# Patient Record
Sex: FEMALE | Race: WHITE | URBAN - METROPOLITAN AREA
[De-identification: names, ages, dates, MRNs, and addresses within clinical notes are randomized per-mention and may not be internally consistent; named-entity substitution may affect disease eponyms.]

---

## 2023-08-18 PROBLEM — R53.83 FATIGUE: Status: ACTIVE | Noted: 2023-08-18

## 2023-08-18 PROBLEM — N60.42 MAMMARY DUCT ECTASIA OF LEFT BREAST: Status: ACTIVE | Noted: 2023-08-18

## 2023-08-18 PROBLEM — F17.200 NICOTINE DEPENDENCE: Status: ACTIVE | Noted: 2023-08-18

## 2023-08-18 PROBLEM — Z98.891 H/O: CESAREAN SECTION: Status: ACTIVE | Noted: 2023-08-18

## 2023-08-18 PROBLEM — E87.6 HYPOKALEMIA: Status: ACTIVE | Noted: 2023-08-18

## 2023-08-18 PROBLEM — R12 HEARTBURN: Status: ACTIVE | Noted: 2023-08-18

## 2023-08-18 RX ORDER — OMEPRAZOLE 40 MG/1
1 CAPSULE, DELAYED RELEASE ORAL DAILY
COMMUNITY
Start: 2021-06-24 | End: 2023-08-23 | Stop reason: SDUPTHER

## 2023-08-18 RX ORDER — MULTIVITAMIN
1 TABLET ORAL DAILY
COMMUNITY
End: 2023-08-23 | Stop reason: WASHOUT

## 2023-08-18 RX ORDER — NORETHINDRONE 0.35 MG/1
1 TABLET ORAL DAILY
COMMUNITY
End: 2023-08-23 | Stop reason: WASHOUT

## 2023-08-18 RX ORDER — POTASSIUM CHLORIDE 750 MG/1
CAPSULE, EXTENDED RELEASE ORAL
COMMUNITY
Start: 2022-05-17 | End: 2023-08-23 | Stop reason: WASHOUT

## 2023-08-23 ENCOUNTER — OFFICE VISIT (OUTPATIENT)
Dept: PRIMARY CARE | Facility: CLINIC | Age: 43
End: 2023-08-23
Payer: COMMERCIAL

## 2023-08-23 VITALS
DIASTOLIC BLOOD PRESSURE: 87 MMHG | OXYGEN SATURATION: 100 % | HEIGHT: 66 IN | WEIGHT: 143.6 LBS | SYSTOLIC BLOOD PRESSURE: 133 MMHG | HEART RATE: 75 BPM | BODY MASS INDEX: 23.08 KG/M2

## 2023-08-23 DIAGNOSIS — R19.7 INTERMITTENT DIARRHEA: Primary | ICD-10-CM

## 2023-08-23 DIAGNOSIS — R12 HEARTBURN: ICD-10-CM

## 2023-08-23 DIAGNOSIS — Z91.018 FOOD ALLERGY: ICD-10-CM

## 2023-08-23 PROCEDURE — 99213 OFFICE O/P EST LOW 20 MIN: CPT | Performed by: NURSE PRACTITIONER

## 2023-08-23 RX ORDER — OMEPRAZOLE 40 MG/1
40 CAPSULE, DELAYED RELEASE ORAL DAILY
Qty: 90 CAPSULE | Refills: 3 | Status: SHIPPED | OUTPATIENT
Start: 2023-08-23

## 2023-08-23 NOTE — PROGRESS NOTES
Subjective   Patient ID: Leann Yepez is a 43 y.o. female who presents for Diverticulitis (Sx pain in lower left side of stomach, bloating. Pt was already diagnosed with IBS and experiences IBS Sx often. Within the last 2 weeks, pt has has Sx increased. Pt stated her mother has Diverticulitis so she believes she has it as well./ ).    HPI     Reports that she believes she has IBS-D. Symptoms come on with anxiety, dairy products, alcohol, tobacco use, or skipping meals. She gets soft stools about twice per month. Twice in the last year, she has episodes of LLQ abdominal pain, nausea, bloating, and worsened diarrhea. The most recent episode was 2 weeks ago. Episodes last about 2 days. This most recent time she took Pepto and it helped. Denies vomiting. This last episode she had blood on the toilet paper when she wiped. Has never had a colonoscopy. No family history of colon cancer. Her mother has diverticulosis. She believes she is having diverticulitis flares.     She does have heartburn and takes omeprazole. Would like a refill.     Also mentions that with certain foods she gets a tingling sensation in her mouth. Denies mouth swelling, trouble breathing.     Review of Systems  ROS negative except as noted above in HPI.       Objective   There were no vitals taken for this visit.    Physical Exam  General: Alert and oriented, in no acute distress. Appears stated age, well-nourished, and well hydrated  HEENT:  - Head: Normocephalic and atraumatic   - Eyes: EOMI, PERRLA  - ENT: Hearing grossly intact  Heart: RRR. No murmurs, clicks, or rubs  Lungs: Unlabored breathing. CTAB with no crackles, wheezes, or rhonchi  Abdomen: Normal BS in all 4 quadrants. Soft, non-tender, non-distended, with no masses  Extremities: Warm and well perfused. No edema. Normal peripheral pulses  Musculoskeletal: Normal gait and station  Neurological: Alert and oriented. No gross neurological deficits  Psychological: Appropriate mood and  affect  Skin: No rash, abnormal lesions, cyanosis, or erythema      Assessment/Plan   Intermittent diarrhea  - Patient believes she has IBS, possible diverticulosis   - Referral placed for GI  - Check food allergy profile, celiac    Mouth tingling  - Check food allergies     RTC pending the above    RENEE Lewis-CNP  [facility]

## 2023-08-24 DIAGNOSIS — T78.40XA ALLERGY, INITIAL ENCOUNTER: Primary | ICD-10-CM

## 2023-09-25 ENCOUNTER — LAB (OUTPATIENT)
Dept: LAB | Facility: LAB | Age: 43
End: 2023-09-25
Payer: COMMERCIAL

## 2023-09-25 DIAGNOSIS — T78.40XA ALLERGY, INITIAL ENCOUNTER: ICD-10-CM

## 2023-09-25 DIAGNOSIS — Z91.018 FOOD ALLERGY: ICD-10-CM

## 2023-09-25 DIAGNOSIS — R19.7 INTERMITTENT DIARRHEA: ICD-10-CM

## 2023-09-25 PROCEDURE — 86003 ALLG SPEC IGE CRUDE XTRC EA: CPT

## 2023-09-25 PROCEDURE — 36415 COLL VENOUS BLD VENIPUNCTURE: CPT

## 2023-09-25 PROCEDURE — 82785 ASSAY OF IGE: CPT

## 2023-09-26 LAB
ALLERGEN ANIMAL: CAT DANDER IGE (KU/L): <0.1 KU/L
ALLERGEN ANIMAL: DOG DANDER IGE (KU/L): <0.1 KU/L
ALLERGEN FOOD: BANANA (MUSA SPP.) IGE (KU/L): <0.1 KU/L
ALLERGEN FOOD: CLAM (RUDITAPES SPP.) IGE (KU/L): <0.1 KU/L
ALLERGEN FOOD: EGG WHITE IGE (KU/L): <0.1 KU/L
ALLERGEN FOOD: FISH (COD) GADUS MORHUA) IGE (KU/L): <0.1 KU/L
ALLERGEN FOOD: MAIZE, CORN (ZEA MAYS) IGE (KU/L): <0.1 KU/L
ALLERGEN FOOD: MILK IGE (KU/L): <0.1 KU/L
ALLERGEN FOOD: PEANUT (ARACHIS HYPOGAEA) IGE (KU/L): <0.1 KU/L
ALLERGEN FOOD: SCALLOP (PECTEN SPP.) IGE (KU/L): <0.1 KU/L
ALLERGEN FOOD: SESAME SEED (SESAMUM INDICUM) IGE (KU/L): <0.1 KU/L
ALLERGEN FOOD: SHRIMP (P. BOREALIS/MONODON, M. BARBATA/JOYNERI) IGE (KU/L): <0.1 KU/L
ALLERGEN FOOD: SOYBEAN (GLYCINE MAX) IGE (KU/L): <0.1 KU/L
ALLERGEN FOOD: STRAWBERRY (FRAGARIA VESCA) IGE (KU/L): <0.1 KU/L
ALLERGEN FOOD: WALNUT (JUGLANS SPP.) IGE (KU/L): <0.1 KU/L
ALLERGEN FOOD: WHEAT (TRITICUM AESTIVUM) IGE (KU/L): <0.1 KU/L
ALLERGEN GRASS: BERMUDA GRASS (CYNODON DACTYLON) IGE (KU/L): <0.1 KU/L
ALLERGEN GRASS: JOHNSON GRASS (SORGHUM HALEPENSE) IGE (KU/L): <0.1 KU/L
ALLERGEN GRASS: MEADOW GRASS, KENTUCKY BLUE (POA PRATENSIS )IGE (KU/L): <0.1 KU/L
ALLERGEN GRASS: TIMOTHY GRASS (PHLEUM PRATENSE) IGE (KU/L): <0.1 KU/L
ALLERGEN INSECT: COCKROACH IGE: <0.1 KU/L
ALLERGEN MICROORGANISM: ALTERNARIA ALTERNATA IGE (KU/L): <0.1 KU/L
ALLERGEN MICROORGANISM: ASPERGILLUS FUMIGATUS IGE (KU/L): <0.1 KU/L
ALLERGEN MICROORGANISM: CLADOSPORIUM HERBARUM IGE (KU/L): <0.1 KU/L
ALLERGEN MICROORGANISM: PENICILLIUM CHRYSOGENUM (P. NOTATUM) IGE (KU/L): <0.1 KU/L
ALLERGEN MITE: DERMATOPHAGOIDES FARINAE (HOUSE DUST MITE) IGE (KU/L): 2.06 KU/L
ALLERGEN MITE: DERMATOPHAGOIDES PTERONYSSINUS (HOUSE DUST MITE) IGE (KU/L): 1.73 KU/L
ALLERGEN TREE: BOX-ELDER (ACER NEGUNDO) IGE (KU/L): <0.1 KU/L
ALLERGEN TREE: COMMON SILVER BIRCH (BETULA VERRUCOSA) IGE (KU/L): <0.1 KU/L
ALLERGEN TREE: COTTONWOOD (POPULUS DELTOIDES) IGE (KU/L): <0.1 KU/L
ALLERGEN TREE: ELM (ULMUS AMERICANA) IGE (KU/L): <0.1 KU/L
ALLERGEN TREE: MAPLE LEAF SYCAMORE, LONDON PLANE IGE (KU/L): <0.1 KU/L
ALLERGEN TREE: MOUNTAIN JUNIPER (JUNIPERUS SABINOIDES) IGE (KU/L): <0.1 KU/L
ALLERGEN TREE: MULBERRY (MORUS ALBA) IGE (KU/L): <0.1 KU/L
ALLERGEN TREE: OAK (QUERCUS ALBA) IGE (KU/L): <0.1 KU/L
ALLERGEN TREE: PECAN, HICKORY (CARYA PECAN) IGE (KU/L): <0.1 KU/L
ALLERGEN TREE: WALNUT IGE: <0.1 KU/L
ALLERGEN TREE: WHITE ASH (FRAXINUS AMERICANA) IGE (KU/L): <0.1 KU/L
ALLERGEN WEED: COMMON PIGWEED (AMARANTHUS RETROFLEXUS) IGE (KU/L): <0.1 KU/L
ALLERGEN WEED: COMMON RAGWEED (AMB. ARTEMISIIFOLIA/A. ELATIOR) IGE (KU/L): <0.1 KU/L
ALLERGEN WEED: GOOSEFOOT, LAMB'S QUARTERS (CHENOPODIUM ALBUM) IGE (KU/L): <0.1 KU/L
ALLERGEN WEED: PLANTAIN (ENGLISH), RIBWORT (PLANTAGO LANCEOLATA) IGE (KU/L): <0.1 KU/L
ALLERGEN WEED: PRICKLY SALTWORT/RUSSIAN THISTLE (SALSOLA KALI) IGE (KU/L): <0.1 KU/L
ALLERGEN WEED: SHEEP SORREL (RUMEX ACETOSELLA) IGE (KU/L): <0.1 KU/L
IMMUNOCAP IGE: 22.1 KU/L (ref 0–214)
IMMUNOCAP INTERPRETATION: ABNORMAL
IMMUNOCAP INTERPRETATION: NORMAL
Lab: <0.1 KU/L
Lab: <0.1 KU/L

## 2023-09-29 LAB
ALLERGEN FOOD: PINEAPPLE (ANANAS COMOSUS) IGE (KU/L): <0.1 KU/L
IMMUNOCAP INTERPRETATION (ARUP): NORMAL
Lab: <0.1 KU/L

## 2024-04-25 ENCOUNTER — OFFICE VISIT (OUTPATIENT)
Dept: PRIMARY CARE | Facility: CLINIC | Age: 44
End: 2024-04-25
Payer: COMMERCIAL

## 2024-04-25 VITALS
DIASTOLIC BLOOD PRESSURE: 83 MMHG | BODY MASS INDEX: 24.11 KG/M2 | HEART RATE: 84 BPM | SYSTOLIC BLOOD PRESSURE: 124 MMHG | WEIGHT: 150 LBS | HEIGHT: 66 IN

## 2024-04-25 DIAGNOSIS — Z13.6 SCREENING FOR CARDIOVASCULAR CONDITION: ICD-10-CM

## 2024-04-25 DIAGNOSIS — G89.29 CHRONIC PAIN OF BOTH KNEES: Primary | ICD-10-CM

## 2024-04-25 DIAGNOSIS — M25.562 CHRONIC PAIN OF BOTH KNEES: Primary | ICD-10-CM

## 2024-04-25 DIAGNOSIS — M25.561 CHRONIC PAIN OF BOTH KNEES: Primary | ICD-10-CM

## 2024-04-25 LAB
ALBUMIN SERPL BCP-MCNC: 4.3 G/DL (ref 3.4–5)
ALP SERPL-CCNC: 52 U/L (ref 33–110)
ALT SERPL W P-5'-P-CCNC: 16 U/L (ref 7–45)
ANION GAP SERPL CALC-SCNC: 10 MMOL/L (ref 10–20)
AST SERPL W P-5'-P-CCNC: 18 U/L (ref 9–39)
BILIRUB SERPL-MCNC: 0.4 MG/DL (ref 0–1.2)
BUN SERPL-MCNC: 10 MG/DL (ref 6–23)
CALCIUM SERPL-MCNC: 9.1 MG/DL (ref 8.6–10.6)
CHLORIDE SERPL-SCNC: 107 MMOL/L (ref 98–107)
CHOLEST SERPL-MCNC: 155 MG/DL (ref 0–199)
CHOLESTEROL/HDL RATIO: 2.9
CO2 SERPL-SCNC: 29 MMOL/L (ref 21–32)
CREAT SERPL-MCNC: 0.75 MG/DL (ref 0.5–1.05)
EGFRCR SERPLBLD CKD-EPI 2021: >90 ML/MIN/1.73M*2
ERYTHROCYTE [SEDIMENTATION RATE] IN BLOOD BY WESTERGREN METHOD: 3 MM/H (ref 0–20)
EST. AVERAGE GLUCOSE BLD GHB EST-MCNC: 91 MG/DL
GLUCOSE SERPL-MCNC: 89 MG/DL (ref 74–99)
HBA1C MFR BLD: 4.8 %
HDLC SERPL-MCNC: 53.2 MG/DL
LDLC SERPL CALC-MCNC: 86 MG/DL
NON HDL CHOLESTEROL: 102 MG/DL (ref 0–149)
POTASSIUM SERPL-SCNC: 3.5 MMOL/L (ref 3.5–5.3)
PROT SERPL-MCNC: 6.8 G/DL (ref 6.4–8.2)
RHEUMATOID FACT SER NEPH-ACNC: <10 IU/ML (ref 0–15)
SODIUM SERPL-SCNC: 142 MMOL/L (ref 136–145)
TRIGL SERPL-MCNC: 78 MG/DL (ref 0–149)
URATE SERPL-MCNC: 3.6 MG/DL (ref 2.3–6.7)
VLDL: 16 MG/DL (ref 0–40)

## 2024-04-25 PROCEDURE — 86038 ANTINUCLEAR ANTIBODIES: CPT

## 2024-04-25 PROCEDURE — 80053 COMPREHEN METABOLIC PANEL: CPT

## 2024-04-25 PROCEDURE — 36415 COLL VENOUS BLD VENIPUNCTURE: CPT

## 2024-04-25 PROCEDURE — 99213 OFFICE O/P EST LOW 20 MIN: CPT | Performed by: NURSE PRACTITIONER

## 2024-04-25 PROCEDURE — 84550 ASSAY OF BLOOD/URIC ACID: CPT

## 2024-04-25 PROCEDURE — 80061 LIPID PANEL: CPT

## 2024-04-25 PROCEDURE — 83036 HEMOGLOBIN GLYCOSYLATED A1C: CPT

## 2024-04-25 PROCEDURE — 85652 RBC SED RATE AUTOMATED: CPT

## 2024-04-25 PROCEDURE — 86431 RHEUMATOID FACTOR QUANT: CPT

## 2024-04-25 ASSESSMENT — ENCOUNTER SYMPTOMS
DEPRESSION: 0
OCCASIONAL FEELINGS OF UNSTEADINESS: 0
LOSS OF SENSATION IN FEET: 0

## 2024-04-25 NOTE — PROGRESS NOTES
"Subjective   Patient ID: Leann Yepez is a 43 y.o. female who presents for knee pain.     HPI   Knee pain  Reports 1 year history of BL knee pain  Denies any injury or trauma  L worse than R  Initially had a grinding sensation for a couple weeks but that has since resolved  She states they were swollen at one point  About a month ago, she started having worse pain  Her knee feels unstable  Occasionally has a cold/tingling sensation  Pain is not constant, worse with activity   Has taken tylenol and advil which resolves the pain  Wears compression leggings which also help    Review of Systems  ROS negative except as noted above in HPI.     Objective   /83   Pulse 84   Ht 1.676 m (5' 6\")   Wt 68 kg (150 lb)   BMI 24.21 kg/m²     Physical Exam  General: Alert and oriented, in no acute distress. Appears stated age, well-nourished, and well hydrated  HEENT:  - Head: Normocephalic and atraumatic   - Eyes: EOMI, PERRLA  - ENT: Hearing grossly intact  Heart: RRR. No murmurs, clicks, or rubs  Lungs: Unlabored breathing. CTAB with no crackles, wheezes, or rhonchi  Abdomen: Normal BS in all 4 quadrants. Soft, non-tender, non-distended, with no masses  Extremities: Warm and well perfused. No edema. Normal peripheral pulses  Musculoskeletal: TTP of lateral aspect of L knee. Crepitus with flexion of L knee. Normal ROM of BL knees. Normal gait and station  Neurological: Alert and oriented. No gross neurological deficits  Psychological: Appropriate mood and affect  Skin: No rash, abnormal lesions, cyanosis, or erythema    Assessment/Plan   BL knee pain  - Will obtain x-rays  - Will obtain arthritis panel to rule out RA given bilateral nature   - Physical therapy referral  - Can continue ibuprofen or tylenol prn, compression    2. Screening for CV condition  - Check lipid panel, HgA1c, CMP, CBC    RTC in the next few months for physical or sooner prn    RENEE Lewis-CNP  [facility]          "

## 2024-04-25 NOTE — PROGRESS NOTES
Pt comes in for bilateral knee pain. Pt states they were making a grinding sound a couple weeks ago.

## 2024-04-26 LAB — ANA SER QL HEP2 SUBST: NEGATIVE

## 2024-09-09 DIAGNOSIS — R12 HEARTBURN: ICD-10-CM

## 2024-09-09 RX ORDER — OMEPRAZOLE 40 MG/1
40 CAPSULE, DELAYED RELEASE ORAL DAILY
Qty: 90 CAPSULE | Refills: 3 | Status: SHIPPED | OUTPATIENT
Start: 2024-09-09

## 2025-03-21 ENCOUNTER — APPOINTMENT (OUTPATIENT)
Dept: PRIMARY CARE | Facility: CLINIC | Age: 45
End: 2025-03-21
Payer: COMMERCIAL

## 2025-03-21 VITALS
OXYGEN SATURATION: 98 % | BODY MASS INDEX: 26.61 KG/M2 | TEMPERATURE: 97.6 F | DIASTOLIC BLOOD PRESSURE: 82 MMHG | SYSTOLIC BLOOD PRESSURE: 136 MMHG | HEART RATE: 88 BPM | WEIGHT: 165.6 LBS | RESPIRATION RATE: 17 BRPM | HEIGHT: 66 IN

## 2025-03-21 DIAGNOSIS — K21.00 GASTROESOPHAGEAL REFLUX DISEASE WITH ESOPHAGITIS WITHOUT HEMORRHAGE: ICD-10-CM

## 2025-03-21 DIAGNOSIS — Z00.00 ANNUAL PHYSICAL EXAM: Primary | ICD-10-CM

## 2025-03-21 DIAGNOSIS — Z01.82 ENCOUNTER FOR ALLERGY TESTING: ICD-10-CM

## 2025-03-21 DIAGNOSIS — H10.13 ALLERGIC CONJUNCTIVITIS OF BOTH EYES AND RHINITIS: ICD-10-CM

## 2025-03-21 DIAGNOSIS — J30.9 ALLERGIC CONJUNCTIVITIS OF BOTH EYES AND RHINITIS: ICD-10-CM

## 2025-03-21 DIAGNOSIS — Z01.419 ENCOUNTER FOR GYNECOLOGICAL EXAMINATION: ICD-10-CM

## 2025-03-21 DIAGNOSIS — Z12.31 SCREENING MAMMOGRAM FOR BREAST CANCER: ICD-10-CM

## 2025-03-21 PROCEDURE — 99214 OFFICE O/P EST MOD 30 MIN: CPT | Performed by: NURSE PRACTITIONER

## 2025-03-21 PROCEDURE — 3008F BODY MASS INDEX DOCD: CPT | Performed by: NURSE PRACTITIONER

## 2025-03-21 PROCEDURE — 99395 PREV VISIT EST AGE 18-39: CPT | Performed by: NURSE PRACTITIONER

## 2025-03-21 RX ORDER — FAMOTIDINE 20 MG/1
20 TABLET, FILM COATED ORAL 2 TIMES DAILY
Qty: 180 TABLET | Refills: 1 | Status: SHIPPED | OUTPATIENT
Start: 2025-03-21 | End: 2025-09-17

## 2025-03-21 RX ORDER — OLOPATADINE HYDROCHLORIDE 1 MG/ML
1 SOLUTION/ DROPS OPHTHALMIC 2 TIMES DAILY
Qty: 5 ML | Refills: 0 | Status: SHIPPED | OUTPATIENT
Start: 2025-03-21 | End: 2025-07-19

## 2025-03-21 RX ORDER — FLUTICASONE PROPIONATE 50 MCG
1 SPRAY, SUSPENSION (ML) NASAL DAILY
Qty: 16 G | Refills: 5 | Status: SHIPPED | OUTPATIENT
Start: 2025-03-21 | End: 2026-03-21

## 2025-03-21 ASSESSMENT — ENCOUNTER SYMPTOMS
PSYCHIATRIC NEGATIVE: 1
NERVOUS/ANXIOUS: 0
NEUROLOGICAL NEGATIVE: 1
WEAKNESS: 0
MUSCULOSKELETAL NEGATIVE: 1
SLEEP DISTURBANCE: 0
CONFUSION: 0
CONSTITUTIONAL NEGATIVE: 1
LIGHT-HEADEDNESS: 0
SINUS PRESSURE: 1
SINUS PAIN: 1
HEMATOLOGIC/LYMPHATIC NEGATIVE: 1
EYES NEGATIVE: 1
FACIAL ASYMMETRY: 0
WOUND: 0
ENDOCRINE NEGATIVE: 1
GASTROINTESTINAL NEGATIVE: 1
DIZZINESS: 0
ALLERGIC/IMMUNOLOGIC NEGATIVE: 1
CARDIOVASCULAR NEGATIVE: 1
POLYPHAGIA: 0
RESPIRATORY NEGATIVE: 1

## 2025-03-21 ASSESSMENT — PATIENT HEALTH QUESTIONNAIRE - PHQ9
1. LITTLE INTEREST OR PLEASURE IN DOING THINGS: NOT AT ALL
SUM OF ALL RESPONSES TO PHQ9 QUESTIONS 1 AND 2: 0
2. FEELING DOWN, DEPRESSED OR HOPELESS: NOT AT ALL

## 2025-03-21 ASSESSMENT — PAIN SCALES - GENERAL: PAINLEVEL_OUTOF10: 0-NO PAIN

## 2025-03-21 NOTE — PROGRESS NOTES
"Subjective   Patient ID: Leann Yepez is a 44 y.o. female who presents for New Patient Visit.    HPI   Leann is here for her annual exam   Wants to see an allergy specialist  does endorse swelling and tingling every time she eats citrus foods.  Did have allergy workup in the past was negative.  Would like to speak with someone.  Has chronically been taking a PPI without a GI workup.  We have discussed side effects of long-term use of this medication.  We will start Pepcid twice daily and she will stop her PPI at this time and follow-up with this provider for possible workup with EGD.    Current smoker 1PPD > 20 yrs declines sensation at this time.    She is due for routine lab work next month.  Orders have been placed.  She is due for mammogram screening order has been placed.  She does complain of eye itching and watering and nasal congestion.  We will start eyedrops and nasal spray at this time.      PMH: GERD     Review of Systems   Constitutional: Negative.    HENT:  Positive for sinus pressure and sinus pain.    Eyes: Negative.    Respiratory: Negative.     Cardiovascular: Negative.    Gastrointestinal: Negative.    Endocrine: Negative.  Negative for polyphagia.   Genitourinary: Negative.    Musculoskeletal: Negative.    Skin: Negative.  Negative for pallor, rash and wound.   Allergic/Immunologic: Negative.    Neurological: Negative.  Negative for dizziness, facial asymmetry, weakness and light-headedness.   Hematological: Negative.    Psychiatric/Behavioral: Negative.  Negative for confusion, sleep disturbance and suicidal ideas. The patient is not nervous/anxious.    All other systems reviewed and are negative.      Objective   /82 (BP Location: Left arm, Patient Position: Sitting, BP Cuff Size: Adult)   Pulse 88   Temp 36.4 °C (97.6 °F) (Temporal)   Resp 17   Ht 1.676 m (5' 6\")   Wt 75.1 kg (165 lb 9.6 oz)   LMP 03/06/2025 (Approximate)   SpO2 98%   BMI 26.73 kg/m²     Physical " Exam  Vitals and nursing note reviewed.   Constitutional:       Appearance: Normal appearance. She is normal weight.   HENT:      Head: Normocephalic.      Nose: Nose normal.      Mouth/Throat:      Mouth: Mucous membranes are moist.   Eyes:      Extraocular Movements: Extraocular movements intact.      Conjunctiva/sclera: Conjunctivae normal.      Pupils: Pupils are equal, round, and reactive to light.   Cardiovascular:      Rate and Rhythm: Normal rate and regular rhythm.      Pulses: Normal pulses.      Heart sounds: Normal heart sounds.   Pulmonary:      Effort: Pulmonary effort is normal.   Abdominal:      General: Abdomen is flat. Bowel sounds are normal.      Palpations: Abdomen is soft.   Musculoskeletal:         General: Normal range of motion.      Cervical back: Normal range of motion.   Skin:     General: Skin is warm and dry.   Neurological:      General: No focal deficit present.      Mental Status: She is alert and oriented to person, place, and time.   Psychiatric:         Mood and Affect: Mood normal.         Behavior: Behavior normal.         Assessment/Plan   1. Annual physical exam (Primary)  - Hemoglobin A1C; Future  - CBC; Future  - Comprehensive Metabolic Panel; Future  - Lipid Panel; Future  - TSH with reflex to Free T4 if abnormal; Future    3. Encounter for gynecological examination  - Referral to Gynecology; Future    4. Screening mammogram for breast cancer    - BI mammo bilateral screening tomosynthesis; Future    5. Encounter for allergy testing  - Referral to Allergy; Future    6. Allergic conjunctivitis of both eyes and rhinitis  - olopatadine (Patanol) 0.1 % ophthalmic solution; Administer 1 drop into both eyes 2 times a day.  Dispense: 5 mL; Refill: 0  - fluticasone (Flonase) 50 mcg/actuation nasal spray; Administer 1 spray into each nostril once daily. Shake gently. Before first use, prime pump. After use, clean tip and replace cap.  Dispense: 16 g; Refill: 5       Exams or sooner  if problems arise

## 2025-04-01 DIAGNOSIS — Z00.00 ANNUAL PHYSICAL EXAM: ICD-10-CM

## 2025-04-11 DIAGNOSIS — J30.9 CHRONIC ALLERGIC RHINITIS: Primary | ICD-10-CM

## 2025-04-11 RX ORDER — CETIRIZINE HYDROCHLORIDE 10 MG/1
10 TABLET ORAL DAILY
Qty: 90 TABLET | Refills: 3 | Status: SHIPPED | OUTPATIENT
Start: 2025-04-11 | End: 2026-04-11

## 2025-05-01 ENCOUNTER — APPOINTMENT (OUTPATIENT)
Dept: OBSTETRICS AND GYNECOLOGY | Facility: CLINIC | Age: 45
End: 2025-05-01
Payer: COMMERCIAL

## 2025-05-01 VITALS
HEIGHT: 66 IN | DIASTOLIC BLOOD PRESSURE: 76 MMHG | WEIGHT: 162.5 LBS | BODY MASS INDEX: 26.12 KG/M2 | SYSTOLIC BLOOD PRESSURE: 120 MMHG

## 2025-05-01 DIAGNOSIS — Z01.419 ENCOUNTER FOR GYNECOLOGICAL EXAMINATION: ICD-10-CM

## 2025-05-01 DIAGNOSIS — Z01.419 WELL WOMAN EXAM: Primary | ICD-10-CM

## 2025-05-01 DIAGNOSIS — Z30.011 ENCOUNTER FOR INITIAL PRESCRIPTION OF CONTRACEPTIVE PILLS: ICD-10-CM

## 2025-05-01 PROCEDURE — 99386 PREV VISIT NEW AGE 40-64: CPT | Performed by: OBSTETRICS & GYNECOLOGY

## 2025-05-01 PROCEDURE — 3008F BODY MASS INDEX DOCD: CPT | Performed by: OBSTETRICS & GYNECOLOGY

## 2025-05-01 RX ORDER — NORETHINDRONE 0.35 MG/1
1 TABLET ORAL DAILY
Qty: 84 TABLET | Refills: 3 | Status: SHIPPED | OUTPATIENT
Start: 2025-05-01 | End: 2026-05-01

## 2025-05-01 ASSESSMENT — PAIN SCALES - GENERAL: PAINLEVEL_OUTOF10: 0-NO PAIN

## 2025-05-01 NOTE — PROGRESS NOTES
"She can start OCPs as this she wants to start birth control currently not yet that obviously is a contraindication except if she wants a progesterone only pill [employer].  She is trying to disguise it together Leann Yepez is a 44 y.o. female who is here for a routine exam. PCP = Virginia Goldstein, APRN-CNP    Menses : monthly  Contraception : none currently, used POP until rx   HPV vaccine : N/A  Last pap :  normal  Last HPV :  negative  History of abnormal pap : No  Last mammogram :  bilateral diagnostic mammogram and right breast ultrasound normal  History of abnormal mammogram : No  Colon cancer screen : never    ROS  systems reviewed, anything negative noted in HPI    bladder: no dysuria, gross hematuria, urinary frequency, urgency or incontinence  breast: no lumps, nipple d/c, overlying skin changes, redness, or skin retraction    [unfilled]    Past med hx and past surg hx reviewed and notable for:     Objective   /76 (BP Location: Left arm, Patient Position: Sitting)   Ht 1.676 m (5' 6\")   Wt 73.7 kg (162 lb 8 oz)   LMP 2025 (Exact Date)   BMI 26.23 kg/m²      General:   Alert and oriented, in no acute distress   Neck: Supple. No visible thyromegaly.    Breast/Axilla: Normal to palpation bilaterally without masses, skin changes, lymphadenopathy, or nipple discharge.    Abdomen: Soft, non-tender, without masses or organomegaly   Vulva:  Urethra: Normal architecture without erythema, masses, or lesions.   Normal    Vagina: Normal mucosa without lesions, masses, or atrophy. No abnormal vaginal discharge.    Cervix: Normal without masses, lesions, or signs of cervicitis.    Uterus: Normal mobile, non-enlarged uterus    Adnexa: Normal without masses or lesions   Pelvic Floor: No POP noted.    Psych:  Anus/Perineum: Normal affect. Normal mood.   Normal without masses or lesions      Thank you for coming to your annual exam. Your findings during the exam were normal. Specific " topics addressed during this exam included: healthy lifestyle, well woman screening guidelines,     Actions performed during this visit include:  - Clinical breast exam  - Clinical pelvic exam  - pap/hpv  - rx POP, use reviewed  - mammogram per PCP  No orders of the defined types were placed in this encounter.          Please return for your next visit in 1 year.
